# Patient Record
Sex: FEMALE | Race: WHITE | NOT HISPANIC OR LATINO | Employment: STUDENT | URBAN - METROPOLITAN AREA
[De-identification: names, ages, dates, MRNs, and addresses within clinical notes are randomized per-mention and may not be internally consistent; named-entity substitution may affect disease eponyms.]

---

## 2021-07-12 ENCOUNTER — OFFICE VISIT (OUTPATIENT)
Dept: OBGYN CLINIC | Facility: CLINIC | Age: 21
End: 2021-07-12
Payer: COMMERCIAL

## 2021-07-12 VITALS
HEIGHT: 64 IN | DIASTOLIC BLOOD PRESSURE: 66 MMHG | BODY MASS INDEX: 25.61 KG/M2 | HEART RATE: 60 BPM | WEIGHT: 150 LBS | SYSTOLIC BLOOD PRESSURE: 99 MMHG

## 2021-07-12 DIAGNOSIS — S46.911A REPETITIVE STRAIN INJURY OF ELBOW, RIGHT, INITIAL ENCOUNTER: Primary | ICD-10-CM
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
## 2021-07-12 DIAGNOSIS — G56.21 ULNAR NEUROPATHY AT ELBOW OF RIGHT UPPER EXTREMITY: ICD-10-CM

## 2021-07-12 PROCEDURE — 99204 OFFICE O/P NEW MOD 45 MIN: CPT | Performed by: ORTHOPAEDIC SURGERY

## 2021-07-12 NOTE — PROGRESS NOTES
Assessment/Plan:  1  Repetitive strain injury of elbow, right, initial encounter  Ambulatory referral to PT/OT hand therapy   2  Ulnar neuropathy at elbow of right upper extremity  EMG 1 Limb       Scribe Attestation    I,:  Rashmi Jaime MA am acting as a scribe while in the presence of the attending physician :       I,:  Peggy Cadet,  personally performed the services described in this documentation    as scribed in my presence  :          20-year-old female with right flexor pronator mass strain  Patient, her mother, and I engaged in discussion today in regards to her right elbow pain  I discussed with them that I do believe she strained her flexor pronator mass  There is a possibility that during the season she had tightness about the elbow in the flexor pronator mass that caused him to have numbness or tingling,  As this area could of been compressing the ulnar nerve  As this has been going on for a prolonged period of time I would recommend obtaining electrodiagnostic test to determine if  She has ulnar nerve compression at the cubital tunnel  In the interim I would like her to begin hand therapy for stretching, massage, and use of modalities to the right elbow  Referral was  placed into her chart today  She may continue activities as tolerated  I will see her back in 6 weeks time for re-evaluation  Subjective:   Renny Reis is a 24 y o  female who presents to the office today for an evaluation of her right upper extremity  Patient states she has had right arm pain from her elbow to her wrist for about a year and half  She notes no incident of injury of trauma at that time  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  She reports initially she had trouble releasing the ball and that her thumb, middle, and ring finger  She reports she would throw the ball directly into the ground    Initially she was told that this was a case of the "yips"  That she had a mental block that was causing this to happen  In the next season she was evaluated by the athletic training staff who did  Perform massage and stretching on her elbow and wrist   Patient reports she does continue to have pain at the medial elbow that extends into her wrist   She did have numbness and tingling while in season especially with throwing the ball  She reports the numbness is very intermittent at time since she has not been playing softball  Patient reports she has not been playing since April due to the end of the season  She has not tried any bracing, therapy, or medications  Review of Systems   Constitutional: Negative for chills, fever and unexpected weight change  HENT: Negative for hearing loss, nosebleeds and sore throat  Eyes: Negative for pain, redness and visual disturbance  Respiratory: Negative for cough, shortness of breath and wheezing  Cardiovascular: Negative for chest pain, palpitations and leg swelling  Gastrointestinal: Negative for abdominal pain, nausea and vomiting  Endocrine: Negative for polydipsia and polyuria  Genitourinary: Negative for dyspareunia and hematuria  Musculoskeletal: Positive for arthralgias and myalgias  Negative for joint swelling  Skin: Negative for rash and wound  Neurological: Negative for dizziness, numbness and headaches  Psychiatric/Behavioral: Negative for decreased concentration and suicidal ideas  The patient is not nervous/anxious  History reviewed  No pertinent past medical history  History reviewed  No pertinent surgical history  History reviewed  No pertinent family history      Social History     Occupational History    Not on file   Tobacco Use    Smoking status: Never Smoker    Smokeless tobacco: Never Used   Vaping Use    Vaping Use: Never used   Substance and Sexual Activity    Alcohol use: Not Currently    Drug use: Not Currently    Sexual activity: Not on file       No current outpatient medications on file     No Known Allergies    Objective:  Vitals:    07/12/21 1053   BP: 99/66   Pulse: 60       Ortho Exam   Right upper extremity  Skin intact   No erythema or ecchymosis  np swelling noted  FDP, FDS, and extensors intact   Full range of motion of wrist and elbow   Negative tinel's at the cubital tunnel   Negative tinel's over guyon's canal   Negative tinel's proximal median nerve   5/5 triceps  Non tender at the lateral epicondyle   Tender at flexor pronator mass   No pain with resisted wrist flexion and extension   Negative Spurling test    No Wartenberg or foments sign  Compartments are soft   Brisk capillary refill noted to all digits     Physical Exam  Vitals reviewed  Constitutional:       Appearance: She is well-developed  HENT:      Head: Normocephalic and atraumatic  Eyes:      General: No scleral icterus  Conjunctiva/sclera: Conjunctivae normal    Cardiovascular:      Rate and Rhythm: Normal rate  Pulses: Normal pulses  Pulmonary:      Effort: Pulmonary effort is normal  No respiratory distress  Musculoskeletal:      Cervical back: Normal range of motion and neck supple  Comments: As noted in HPI   Skin:     General: Skin is warm and dry  Neurological:      Mental Status: She is alert and oriented to person, place, and time  Psychiatric:         Behavior: Behavior normal          I have personally reviewed pertinent films in PACS and my interpretation is as follows:   No images today

## 2021-08-02 ENCOUNTER — EVALUATION (OUTPATIENT)
Dept: PHYSICAL THERAPY | Facility: CLINIC | Age: 21
End: 2021-08-02
Payer: COMMERCIAL

## 2021-08-02 DIAGNOSIS — M25.521 ELBOW PAIN, RIGHT: Primary | ICD-10-CM

## 2021-08-02 PROCEDURE — 97110 THERAPEUTIC EXERCISES: CPT | Performed by: PHYSICAL THERAPIST

## 2021-08-02 PROCEDURE — 97161 PT EVAL LOW COMPLEX 20 MIN: CPT | Performed by: PHYSICAL THERAPIST

## 2021-08-02 NOTE — PROGRESS NOTES
PT Evaluation     Today's date: 2021  Patient name: Jaswinder Ochoa  : 2000  MRN: [id number]  Referring provider: Herb Lucas DO  Dx:   Encounter Diagnosis     ICD-10-CM    1  Elbow pain, right  M25 521                   Assessment  Assessment details: Jaswinder Ochoa is a 22 y o  female who presents to physical therapy with diagnosis of right elbow pain   Jose Villalpando presents with pain, abnormal posture, and limitations in range of motion, strength, joint mobility, flexibility, and functional ability  The current limitations are affecting Ainsley's ability to function at prior level  She will benefit from skilled physical therapy to address the current impairments and functional limitations to enable her to return to daily activities at maximal level  Thank you for the referral     Impairments: abnormal or restricted ROM, activity intolerance, impaired physical strength, lacks appropriate home exercise program, weight-bearing intolerance and poor posture     Symptom irritability: lowUnderstanding of Dx/Px/POC: good   Prognosis: good    Goals  Patient will decrease pain at worst to 1/10  Patient will improve RUE strength 1/2 grade in all deficit planes  Patient will have no pain or discomfort with throwing  Patient will be independent with HEP  Plan  Patient would benefit from: skilled physical therapy  Planned modality interventions: cryotherapy and thermotherapy: hydrocollator packs  Planned therapy interventions: manual therapy, neuromuscular re-education, patient education, therapeutic activities, therapeutic exercise, therapeutic training and home exercise program  Frequency: 2x week  Duration in weeks: 6  Treatment plan discussed with: patient        Subjective Evaluation    History of Present Illness  Mechanism of injury: Jose Villalpando is a 22 y o  female presenting to physical therapy on 21 with primary complaints of right elbow pain  She mentions it started last year during the softball season   She had the "yips" where she could not throw the ball and then when she got over that she started having pain  She mentions it doesn't bother her unless she is in season/throwing  She has been playing softball since she was 6, 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  She mentions she has no difficulty completing ADLs or IADLs  She is in her off season right now until September she will play for about a month, then transition into workouts, then transition into full time in the spring  She mentions she does get occasional numbness in her pinkie and is going for nerve testing in October  Pain  Current pain ratin  At best pain ratin  At worst pain ratin  Location: R elbow / forearm  Quality: sharp  Relieving factors: rest    Hand dominance: right      Diagnostic Tests  No diagnostic tests performed  Treatments  No previous or current treatments  Patient Goals  Patient goals for therapy: decreased pain, increased strength and return to sport/leisure activities  Patient goal: "workout this issue as much as i can, so i can be better for the spring"        Objective    Posture: rounded shoulders, slouched  Palpation: tenderness noted along flexor and extensor mass on R UE  Elbow AROM:  Right: WFL  Left: WFL     R Wrist AROM:  Flexion 72 degrees  Extension: 27 degrees  Radial Deviation: 26 degrees  Ulnar Deviation: 36 degrees       UE Strength: (R,L)  Sh Flexion: 5/5 , 4+/5  Sh Abduction: 5/5 , 5/5  Sh Internal Rotation: 5/5 , 5/5  Sh External Rotation: 4+/5 , 4+/5  Elbow Flexion: 5/5 , 4+/5  Elbow Extension: 5/5 , 5/5  Wrist Flexion: 5/5 , 4/5  Wrist Extension: 5/5 , 4/5  Wrist Ulnar Deviation: 5/5 , 4+/5  Wrist Radial Deviation: 5/5 , 5/5  Supination: 5/5, 4+/5  Pronation 5/5, 5/5      Strength:  Left: 45, 55, 45  Right: 55, 45, 45       Special Tests:  Positive: Middle finger Test, hand shake test, cozens, caldwell           Precautions: none       Manuals / IASTM R Flexor Mass                                                    Neuro Re-Ed             Eccentric Wrist Extension 10x            TB Row             TB Ext             TB ER/IR at 90*                                                    Ther Ex             UBE             Wrist Flexion & Extension Stretcehs 15"x4 ea            Flexbar flex / Ext             Flexbar sup/pro             Digigrip Towel Squeeze for HEP 3"x20                                                   Ther Activity             Throwing when tolerated                          Gait Training                                       Modalities             MHP prn             CP prn

## 2021-08-10 ENCOUNTER — OFFICE VISIT (OUTPATIENT)
Dept: PHYSICAL THERAPY | Facility: CLINIC | Age: 21
End: 2021-08-10
Payer: COMMERCIAL

## 2021-08-10 DIAGNOSIS — M25.521 ELBOW PAIN, RIGHT: Primary | ICD-10-CM

## 2021-08-10 PROCEDURE — 97140 MANUAL THERAPY 1/> REGIONS: CPT

## 2021-08-10 PROCEDURE — 97110 THERAPEUTIC EXERCISES: CPT

## 2021-08-10 NOTE — PROGRESS NOTES
Daily Note     Today's date: 8/10/2021  Patient name: Angela Jane  : 2000  MRN: [id number]  Referring provider: Alicja Contreras DO  Dx:   Encounter Diagnosis     ICD-10-CM    1  Elbow pain, right  M25 521                   Subjective: Patient states she is doing alright today  Reports that she has a 2/10 pain arriving to clinic  Objective: See treatment diary below      Assessment: Tolerated treatment well  Initiated POC as able this visit  Provided patient with IASTM and TPR on anterior forearm, she began to feel faint and noted she started feeling spots  Patient's face turned pale  When asked if she had eaten anything prior to PT she denied  Assessed patients vitals which were all normal: 118/80 mmHg, 64bpm, 96% O2  Provided patient with candy and she slowly began to feel better  Concluded with ice and monitored patients symptoms before she left  She noted she was feeling fine prior to leaving  Encouraged patient to eat something before coming to PT to avoid feeling faint next visit, demonstrated understanding  Patient demonstrated fatigue post treatment, exhibited good technique with therapeutic exercises and would benefit from continued PT      Plan: Continue per plan of care        Precautions: none       Manuals 8/2 8/10           IASTM R Flexor Mass  RA IASTM and TPR to anterior forearm                                                  Neuro Re-Ed             Eccentric Wrist Extension 10x            TB Row  5"x 20GTB           TB Ext  5"x 20 GTB            TB ER/IR at 90*                                                    Ther Ex             UBE  5 min retro            Wrist Flexion & Extension Stretcehs 15"x4 ea 15"x4            Flexbar flex / Ext  10"x15           Flexbar sup/pro  10"x15            Digigrip Towel Squeeze for HEP 3"x20 Towel squeeze for HEP 3"x 20                                                   Ther Activity             Throwing when tolerated                          Gait Training                                       Modalities             MHP prn             CP prn

## 2021-08-13 ENCOUNTER — OFFICE VISIT (OUTPATIENT)
Dept: PHYSICAL THERAPY | Facility: CLINIC | Age: 21
End: 2021-08-13
Payer: COMMERCIAL

## 2021-08-13 DIAGNOSIS — M25.521 ELBOW PAIN, RIGHT: Primary | ICD-10-CM

## 2021-08-13 PROCEDURE — 97140 MANUAL THERAPY 1/> REGIONS: CPT

## 2021-08-13 PROCEDURE — 97110 THERAPEUTIC EXERCISES: CPT

## 2021-08-13 NOTE — PROGRESS NOTES
Daily Note     Today's date: 2021  Patient name: Matt Kim  : 2000  MRN: [id number]  Referring provider: Uilses Du DO  Dx:   Encounter Diagnosis     ICD-10-CM    1  Elbow pain, right  M25 521                   Subjective: Patient states she is doing well today  Denies pain arriving to therapy  Objective: See treatment diary below      Assessment: Tolerated treatment well  Patient did much better in PT as compared to last visit  She demonstrated less tenderness palpated to anteroir forearm  Progressed patient throughout session today  Added in throwing with no reports of pain  Will continue to progress as able  Patient demonstrated fatigue post treatment, exhibited good technique with therapeutic exercises and would benefit from continued PT      Plan: Continue per plan of care        Precautions: none       Manuals 8/2 8/10 8/13          IASTM R Flexor Mass  RA IASTM and TPR to anterior forearm RA IASTM and TPR to anterior forearm                                                 Neuro Re-Ed             Eccentric Wrist Extension 10x            TB Row  5"x 20GTB 5"x 20 GTB           TB Ext  5"x 20 GTB  5"x20 GTB          TB ER/IR at 90*   GTB 5 sec x 20                                                 Ther Ex             UBE  5 min retro  5 min retro           Wrist Flexion & Extension Stretcehs 15"x4 ea 15"x4  20 sec x 3          Flexbar flex / Ext  10"x15 10"x15          Flexbar sup/pro  10"x15  10" x 15          Digigrip Towel Squeeze for HEP 3"x20 Towel squeeze for HEP 3"x 20  Towel squeeze 3"x20          Wrist extension   2# 30          Wrist flexion   2# 30                        Ther Activity             Throwing when tolerated   Tramp throw  YMB 30                       Gait Training                                       Modalities             MHP prn             CP prn

## 2021-08-16 ENCOUNTER — APPOINTMENT (OUTPATIENT)
Dept: PHYSICAL THERAPY | Facility: CLINIC | Age: 21
End: 2021-08-16
Payer: COMMERCIAL

## 2021-08-20 ENCOUNTER — OFFICE VISIT (OUTPATIENT)
Dept: PHYSICAL THERAPY | Facility: CLINIC | Age: 21
End: 2021-08-20
Payer: COMMERCIAL

## 2021-08-20 DIAGNOSIS — M25.521 ELBOW PAIN, RIGHT: Primary | ICD-10-CM

## 2021-08-20 PROCEDURE — 97140 MANUAL THERAPY 1/> REGIONS: CPT

## 2021-08-20 PROCEDURE — 97110 THERAPEUTIC EXERCISES: CPT

## 2021-08-20 NOTE — PROGRESS NOTES
Daily Note     Today's date: 2021  Patient name: Marietta Read  : 2000  MRN: [id number]  Referring provider: Shilo Mead DO  Dx:   Encounter Diagnosis     ICD-10-CM    1  Elbow pain, right  M25 521                   Subjective: Patient states she had mild soreness last visit  Reports that her pain has been pretty good overall  Objective: See treatment diary below      Assessment: Tolerated treatment well  Patient has been demonstrating improvements with pain and overall function of elbow  Progressed patient through Te today and added additional exercises  Less tenderness palpated with manuals  Encouraged patient to resume with PT until she begins softball to make sure sxs stay subsided  Patient demonstrated fatigue post treatment, exhibited good technique with therapeutic exercises and would benefit from continued PT      Plan: Continue per plan of care        Precautions: none       Manuals 8/2 8/10 8/13 8/20         IASTM R Flexor Mass  RA IASTM and TPR to anterior forearm RA IASTM and TPR to anterior forearm RA IASTM and TPR to anterior forearm                                                Neuro Re-Ed             Eccentric Wrist Extension 10x            TB Row  5"x 20GTB 5"x 20 GTB  5" x 30 BTB          TB Ext  5"x 20 GTB  5"x20 GTB 5" x 30 BTB         TB ER/IR at 90*   GTB 5 sec x 20 BTB 5 sec x 30                                                 Ther Ex             UBE  5 min retro  5 min retro  5 min          Wrist Flexion & Extension Stretcehs 15"x4 ea 15"x4  20 sec x 3 10 sec x 15 ea         Flexbar flex / Ext  10"x15 10"x15  10"x 15         Flexbar sup/pro  10"x15  10" x 15 10" x 15         Digigrip Towel Squeeze for HEP 3"x20 Towel squeeze for HEP 3"x 20  Towel squeeze 3"x20 Towel squeeze 3x 20"         Wrist extension   2# 30 2# 30         Wrist flexion   2# 30  2# 30                      Ther Activity             Throwing when tolerated   Tramp throw  YMB 30 Tramp throw RMb 30 Wall circles /c RMB    RMB 20 cw, ccw          Gait Training                                       Modalities             MHP prn             CP prn

## 2021-08-23 ENCOUNTER — OFFICE VISIT (OUTPATIENT)
Dept: OBGYN CLINIC | Facility: CLINIC | Age: 21
End: 2021-08-23
Payer: COMMERCIAL

## 2021-08-23 ENCOUNTER — APPOINTMENT (OUTPATIENT)
Dept: PHYSICAL THERAPY | Facility: CLINIC | Age: 21
End: 2021-08-23
Payer: COMMERCIAL

## 2021-08-23 VITALS
WEIGHT: 150 LBS | DIASTOLIC BLOOD PRESSURE: 68 MMHG | SYSTOLIC BLOOD PRESSURE: 106 MMHG | HEIGHT: 64 IN | BODY MASS INDEX: 25.61 KG/M2 | HEART RATE: 80 BPM

## 2021-08-23 DIAGNOSIS — G56.21 ULNAR NEUROPATHY AT ELBOW OF RIGHT UPPER EXTREMITY: ICD-10-CM

## 2021-08-23 DIAGNOSIS — S46.911D REPETITIVE STRAIN INJURY OF RIGHT ELBOW, SUBSEQUENT ENCOUNTER: Primary | ICD-10-CM
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
## 2021-08-23 PROCEDURE — 99213 OFFICE O/P EST LOW 20 MIN: CPT | Performed by: ORTHOPAEDIC SURGERY

## 2021-08-23 RX ORDER — NORGESTIMATE AND ETHINYL ESTRADIOL 7DAYSX3 28
KIT ORAL
COMMUNITY
Start: 2021-08-12

## 2021-08-23 NOTE — PROGRESS NOTES
Assessment/Plan:  1  Repetitive strain injury of right elbow, subsequent encounter     2  Ulnar neuropathy at elbow of right upper extremity         Scribe Attestation    I,:  Malika Jerez MA am acting as a scribe while in the presence of the attending physician :       I,:  Summer Traore DO personally performed the services described in this documentation    as scribed in my presence :             [facility] is doing well  She is non tender on exam  She has full range of motion  Patient was advised to continue with physician directed HEP  She has no restrictions at this time  She was advised to ice the elbow after softball practice  She may take Advil OTC as needed for pain  If she is still experiencing the shooting pain and numbness by October when her EMG is scheduled she was instructed to undergo this study  If she is doing well she may cancel the EMG  She may follow up with me as needed  Subjective:   Mela Melendez is a 22 y o  female who presents to the office today for follow up evaluation right flexor pronator mass strain and ulnar neuritis  Patient states she is doing well  She has been attending formal therapy  She states this has been providing her with some relief  Patient states she recently moved into a new apartment and has been doing a lot of lifting  She states this did increase her pain  She notes pain to the posterior aspect of her elbow  She states this will radiate down into her small finger  She denies any numbness or tingling  She does have the EMG scheduled for October  Review of Systems   Constitutional: Negative for chills and fever  HENT: Negative for drooling and sneezing  Eyes: Negative for redness  Respiratory: Negative for cough and wheezing  Gastrointestinal: Negative for nausea and vomiting  Musculoskeletal: Negative for arthralgias, joint swelling and myalgias  Neurological: Negative for weakness and numbness     Psychiatric/Behavioral: Negative for behavioral problems  The patient is not nervous/anxious  History reviewed  No pertinent past medical history  History reviewed  No pertinent surgical history  History reviewed  No pertinent family history  Social History     Occupational History    Not on file   Tobacco Use    Smoking status: Never Smoker    Smokeless tobacco: Never Used   Vaping Use    Vaping Use: Never used   Substance and Sexual Activity    Alcohol use: Not Currently    Drug use: Not Currently    Sexual activity: Not on file         Current Outpatient Medications:     Tri-Estarylla 0 18/0 215/0 25 MG-35 MCG per tablet, , Disp: , Rfl:     No Known Allergies    Objective:  Vitals:    08/23/21 1717   BP: 106/68   Pulse: 80       Ortho Exam     Right elbow    NTTP lateral epicondyle  NTTP flexor pronator  NTTP medial epicondyle   Full ROM  - tinel's cubital tunnel  Compartments soft  Brisk capillary refill  S/m intact median, radial, and ulnar nerve     Physical Exam  Constitutional:       Appearance: She is well-developed  HENT:      Head: Normocephalic and atraumatic  Eyes:      General:         Right eye: No discharge  Left eye: No discharge  Conjunctiva/sclera: Conjunctivae normal    Cardiovascular:      Rate and Rhythm: Normal rate  Pulmonary:      Effort: Pulmonary effort is normal  No respiratory distress  Musculoskeletal:      Cervical back: Normal range of motion and neck supple  Comments: As noted in HPI   Skin:     General: Skin is warm and dry  Neurological:      Mental Status: She is alert and oriented to person, place, and time  Psychiatric:         Behavior: Behavior normal          Thought Content:  Thought content normal          Judgment: Judgment normal

## 2021-08-25 ENCOUNTER — OFFICE VISIT (OUTPATIENT)
Dept: PHYSICAL THERAPY | Facility: CLINIC | Age: 21
End: 2021-08-25
Payer: COMMERCIAL

## 2021-08-25 DIAGNOSIS — M25.521 ELBOW PAIN, RIGHT: Primary | ICD-10-CM

## 2021-08-25 PROCEDURE — 97140 MANUAL THERAPY 1/> REGIONS: CPT

## 2021-08-25 PROCEDURE — 97110 THERAPEUTIC EXERCISES: CPT

## 2021-08-25 NOTE — PROGRESS NOTES
Daily Note     Today's date: 2021  Patient name: Courtney Masters  : 2000  MRN: [id number]  Referring provider: Julián Velazco DO  Dx:   Encounter Diagnosis     ICD-10-CM    1  Elbow pain, right  M25 521                   Subjective: Patient states that she was moving over the weekend and had increase in symptoms after lifting boxes repetivily  Objective: See treatment diary below      Assessment: Tolerated treatment well  Initiated POC on UBE for warm up  Added in wall clocks today as well as progressed reps of other TE  Applied TPR to olecranon and no complaints of pain  Patient demonstrated fatigue post treatment, exhibited good technique with therapeutic exercises and would benefit from continued PT      Plan: Continue per plan of care        Precautions: none       Manuals 8/2 8/10 8/13 8/20 8/25        IASTM R Flexor Mass  RA IASTM and TPR to anterior forearm RA IASTM and TPR to anterior forearm RA IASTM and TPR to anterior forearm RA IASTM and TPR to anterior forearm                                               Neuro Re-Ed             Eccentric Wrist Extension 10x            TB Row  5"x 20GTB 5"x 20 GTB  5" x 30 BTB  5 sec x 30 BTB        TB Ext  5"x 20 GTB  5"x20 GTB 5" x 30 BTB 5 sec x 30 BTB        TB ER/IR at 90*   GTB 5 sec x 20 BTB 5 sec x 30  BTB 30 ea                                               Ther Ex             UBE  5 min retro  5 min retro  5 min  5 min         Wrist Flexion & Extension Stretcehs 15"x4 ea 15"x4  20 sec x 3 10 sec x 15 ea 10 sec x 15 ea        Flexbar flex / Ext  10"x15 10"x15  10"x 15 10"x 15        Flexbar sup/pro  10"x15  10" x 15 10" x 15 10" x 15        Digigrip Towel Squeeze for HEP 3"x20 Towel squeeze for HEP 3"x 20  Towel squeeze 3"x20 Towel squeeze 3x 20" Towel squeeze 3x 20"        Wrist extension   2# 30 2# 30 3# 30        Wrist flexion   2# 30  2# 30 3# 20        Wall clocks      GTB 10        Ther Activity             Throwing when tolerated Tramp throw  YMB 30 Tramp throw RMb 30 Tramp throw RMB 30        Wall circles /c RMB    RMB 20 cw, ccw  RMB 30 cw, ccw         Gait Training                                       Modalities             MHP prn             CP prn

## 2021-08-27 ENCOUNTER — APPOINTMENT (OUTPATIENT)
Dept: PHYSICAL THERAPY | Facility: CLINIC | Age: 21
End: 2021-08-27
Payer: COMMERCIAL

## 2021-08-30 ENCOUNTER — APPOINTMENT (OUTPATIENT)
Dept: PHYSICAL THERAPY | Facility: CLINIC | Age: 21
End: 2021-08-30
Payer: COMMERCIAL

## 2021-09-01 ENCOUNTER — APPOINTMENT (OUTPATIENT)
Dept: PHYSICAL THERAPY | Facility: CLINIC | Age: 21
End: 2021-09-01
Payer: COMMERCIAL

## 2021-09-03 ENCOUNTER — APPOINTMENT (OUTPATIENT)
Dept: PHYSICAL THERAPY | Facility: CLINIC | Age: 21
End: 2021-09-03
Payer: COMMERCIAL

## 2021-09-08 ENCOUNTER — OFFICE VISIT (OUTPATIENT)
Dept: PHYSICAL THERAPY | Facility: CLINIC | Age: 21
End: 2021-09-08
Payer: COMMERCIAL

## 2021-09-08 DIAGNOSIS — M25.521 ELBOW PAIN, RIGHT: Primary | ICD-10-CM

## 2021-09-08 PROCEDURE — 97140 MANUAL THERAPY 1/> REGIONS: CPT

## 2021-09-08 PROCEDURE — 97110 THERAPEUTIC EXERCISES: CPT

## 2021-09-08 NOTE — PROGRESS NOTES
Daily Note     Today's date: 2021  Patient name: Home Montoya  : 2000  MRN: [id number]  Referring provider: Heather Thompson DO  Dx:   Encounter Diagnosis     ICD-10-CM    1  Elbow pain, right  M25 521                   Subjective: Patient states she is doing alright  Reports she had one softball practice and had some mild discomfort into elbow  Objective: See treatment diary below      Assessment: Tolerated treatment well  Continued to progress patient through exercises  She had demonstrated less tenderness palpated to elbow  Continue to progress as able  Patient demonstrated fatigue post treatment, exhibited good technique with therapeutic exercises and would benefit from continued PT      Plan: Continue per plan of care        Precautions: none       Manuals 8/2 8/10 8/13 8/20 8/25 9/8       IASTM R Flexor Mass  RA IASTM and TPR to anterior forearm RA IASTM and TPR to anterior forearm RA IASTM and TPR to anterior forearm RA IASTM and TPR to anterior forearm RA                                              Neuro Re-Ed             Eccentric Wrist Extension 10x            TB Row  5"x 20GTB 5"x 20 GTB  5" x 30 BTB  5 sec x 30 BTB BTB 30        TB Ext  5"x 20 GTB  5"x20 GTB 5" x 30 BTB 5 sec x 30 BTB BTB 30       TB ER/IR at 90*   GTB 5 sec x 20 BTB 5 sec x 30  BTB 30 ea BTB 30                                              Ther Ex             UBE  5 min retro  5 min retro  5 min  5 min  5 min        Wrist Flexion & Extension Stretcehs 15"x4 ea 15"x4  20 sec x 3 10 sec x 15 ea 10 sec x 15 ea Dc to HEP       Flexbar flex / Ext  10"x15 10"x15  10"x 15 10"x 15 10" x 15       Flexbar sup/pro  10"x15  10" x 15 10" x 15 10" x 15 10 " x 15        Digigrip Towel Squeeze for HEP 3"x20 Towel squeeze for HEP 3"x 20  Towel squeeze 3"x20 Towel squeeze 3x 20" Towel squeeze 3x 20" gripper 25#  10"x15       Wrist extension   2# 30 2# 30 3# 30 3# 30       Wrist flexion   2# 30  2# 30 3# 20 3# 30       Wall clocks GTB 10 GTB 10       Ther Activity             Throwing when tolerated   Tramp throw  YMB 30 Tramp throw RMb 30 Tramp throw RMB 30 tramp throw RMB 3       Wall circles /c RMB    RMB 20 cw, ccw  RMB 30 cw, ccw  RMB 30       Gait Training                                       Modalities             MHP prn             CP prn

## 2021-09-13 ENCOUNTER — APPOINTMENT (OUTPATIENT)
Dept: PHYSICAL THERAPY | Facility: CLINIC | Age: 21
End: 2021-09-13
Payer: COMMERCIAL

## 2021-09-15 ENCOUNTER — OFFICE VISIT (OUTPATIENT)
Dept: PHYSICAL THERAPY | Facility: CLINIC | Age: 21
End: 2021-09-15
Payer: COMMERCIAL

## 2021-09-15 DIAGNOSIS — M25.521 ELBOW PAIN, RIGHT: Primary | ICD-10-CM

## 2021-09-15 PROCEDURE — 97140 MANUAL THERAPY 1/> REGIONS: CPT

## 2021-09-15 PROCEDURE — 97110 THERAPEUTIC EXERCISES: CPT

## 2021-09-15 NOTE — PROGRESS NOTES
Daily Note     Today's date: 9/15/2021  Patient name: Nata Alberts  : 2000  MRN: [id number]  Referring provider: Jose Coronel DO  Dx:   Encounter Diagnosis     ICD-10-CM    1  Elbow pain, right  M25 521                   Subjective: Patient states she is having soreness on posterior forearm  Objective: See treatment diary below      Assessment: Tolerated treatment well  Initiated POC on UBE for warm up  She continues to do well with no complaints of pain throughout session  Added in forearm stretch and encouraged her to be doing this stretch at home  She did present with tightness/tenderness into right side of posterior forarm  Patient demonstrated fatigue post treatment, exhibited good technique with therapeutic exercises and would benefit from continued PT      Plan: Continue per plan of care        Precautions: none       Manuals 8/2 8/10 8/13 8/20 8/25 9/8 9/15      IASTM R Flexor Mass  RA IASTM and TPR to anterior forearm RA IASTM and TPR to anterior forearm RA IASTM and TPR to anterior forearm RA IASTM and TPR to anterior forearm RA RA posterior forearm                                               Neuro Re-Ed             Eccentric Wrist Extension 10x            TB Row  5"x 20GTB 5"x 20 GTB  5" x 30 BTB  5 sec x 30 BTB BTB 30  MTB 30      TB Ext  5"x 20 GTB  5"x20 GTB 5" x 30 BTB 5 sec x 30 BTB BTB 30 MTB 30      TB ER/IR at 90*   GTB 5 sec x 20 BTB 5 sec x 30  BTB 30 ea BTB 30 MTB 30      Forearm stretch       20 sec x 3                                 Ther Ex             UBE  5 min retro  5 min retro  5 min  5 min  5 min  5 min       Wrist Flexion & Extension Stretcehs 15"x4 ea 15"x4  20 sec x 3 10 sec x 15 ea 10 sec x 15 ea Dc to HEP       Flexbar flex / Ext  10"x15 10"x15  10"x 15 10"x 15 10" x 15 10"x15      Flexbar sup/pro  10"x15  10" x 15 10" x 15 10" x 15 10 " x 15  10"x15      Digigrip Towel Squeeze for HEP 3"x20 Towel squeeze for HEP 3"x 20  Towel squeeze 3"x20 Towel squeeze 3x 20" Towel squeeze 3x 20" gripper 25#  10"x15 griper 25# 15 sec x 15       Wrist extension   2# 30 2# 30 3# 30 3# 30 3# 30      Wrist flexion   2# 30  2# 30 3# 20 3# 30 3# 30       Wall clocks      GTB 10 GTB 10 BTB 10      Ther Activity             Throwing when tolerated   Tramp throw  YMB 30 Tramp throw RMb 30 Tramp throw RMB 30 tramp throw RMB 3 tramp throw RMB 30      Wall circles /c RMB    RMB 20 cw, ccw  RMB 30 cw, ccw  RMB 30 RMB 30      Gait Training                                       Modalities             MHP prn             CP prn

## 2021-09-20 ENCOUNTER — APPOINTMENT (OUTPATIENT)
Dept: PHYSICAL THERAPY | Facility: CLINIC | Age: 21
End: 2021-09-20
Payer: COMMERCIAL

## 2021-09-22 ENCOUNTER — APPOINTMENT (OUTPATIENT)
Dept: PHYSICAL THERAPY | Facility: CLINIC | Age: 21
End: 2021-09-22
Payer: COMMERCIAL

## 2021-09-27 ENCOUNTER — APPOINTMENT (OUTPATIENT)
Dept: PHYSICAL THERAPY | Facility: CLINIC | Age: 21
End: 2021-09-27
Payer: COMMERCIAL

## 2021-09-29 ENCOUNTER — OFFICE VISIT (OUTPATIENT)
Dept: PHYSICAL THERAPY | Facility: CLINIC | Age: 21
End: 2021-09-29
Payer: COMMERCIAL

## 2021-09-29 DIAGNOSIS — M25.521 ELBOW PAIN, RIGHT: Primary | ICD-10-CM

## 2021-09-29 PROCEDURE — 97140 MANUAL THERAPY 1/> REGIONS: CPT

## 2021-09-29 PROCEDURE — 97110 THERAPEUTIC EXERCISES: CPT

## 2021-09-29 NOTE — PROGRESS NOTES
Daily Note / Discharge     Today's date: 2021  Patient name: Angela Jane  : 2000  MRN: [id number]  Referring provider: Alicja Contreras DO  Dx:   Encounter Diagnosis     ICD-10-CM    1  Elbow pain, right  M25 521                   Subjective: Patient states she is ready for discharge at this time  Objective: See treatment diary below      Assessment: Tolerated treatment well  Patient has met all goals at this time  Encouraged patient to seek PT in future if she has a flare up         Plan: Discharge      Precautions: none       Manuals 8/2 8/10 8/13 8/20 8/25 9/8 9/15 9/29     IASTM R Flexor Mass  RA IASTM and TPR to anterior forearm RA IASTM and TPR to anterior forearm RA IASTM and TPR to anterior forearm RA IASTM and TPR to anterior forearm RA RA posterior forearm   RA forearm                                             Neuro Re-Ed             Eccentric Wrist Extension 10x            TB Row  5"x 20GTB 5"x 20 GTB  5" x 30 BTB  5 sec x 30 BTB BTB 30  MTB 30 MTB 30     TB Ext  5"x 20 GTB  5"x20 GTB 5" x 30 BTB 5 sec x 30 BTB BTB 30 MTB 30 MTB 30     TB ER/IR at 90*   GTB 5 sec x 20 BTB 5 sec x 30  BTB 30 ea BTB 30 MTB 30 MTB 30     Forearm stretch       20 sec x 3                                 Ther Ex             UBE  5 min retro  5 min retro  5 min  5 min  5 min  5 min  5 min      Wrist Flexion & Extension Stretcehs 15"x4 ea 15"x4  20 sec x 3 10 sec x 15 ea 10 sec x 15 ea Dc to HEP       Flexbar flex / Ext  10"x15 10"x15  10"x 15 10"x 15 10" x 15 10"x15 10"x 15     Flexbar sup/pro  10"x15  10" x 15 10" x 15 10" x 15 10 " x 15  10"x15 10"x 15     Digigrip Towel Squeeze for HEP 3"x20 Towel squeeze for HEP 3"x 20  Towel squeeze 3"x20 Towel squeeze 3x 20" Towel squeeze 3x 20" gripper 25#  10"x15 griper 25# 15 sec x 15  griper 25# 15 sec x 15      Wrist extension   2# 30 2# 30 3# 30 3# 30 3# 30 3# 30     Wrist flexion   2# 30  2# 30 3# 20 3# 30 3# 30  3# 30     Wall clocks      GTB 10 GTB 10 BTB 10 BTB 10      Ther Activity             Throwing when tolerated   Tramp throw  YMB 30 Tramp throw RMb 30 Tramp throw RMB 30 tramp throw RMB 3 tramp throw RMB 30 tramp throw RMB 30        Wall circles /c RMB    RMB 20 cw, ccw  RMB 30 cw, ccw  RMB 30 RMB 30 RMB 30      Gait Training                                       Modalities             MHP prn             CP prn

## 2025-06-26 ENCOUNTER — ANESTHESIA (OUTPATIENT)
Dept: ANESTHESIOLOGY | Facility: HOSPITAL | Age: 25
End: 2025-06-26

## 2025-06-26 ENCOUNTER — ANESTHESIA EVENT (OUTPATIENT)
Dept: ANESTHESIOLOGY | Facility: HOSPITAL | Age: 25
End: 2025-06-26

## 2025-07-01 NOTE — PRE-PROCEDURE INSTRUCTIONS
No outpatient medications have been marked as taking for the 7/11/25 encounter (Hospital Encounter).      Medication instructions for day of surgery reviewed. Please take all instructed medications with only a sip of water. Please do not take any over the counter (non-prescribed) vitamins or supplements for one week prior to date of surgery.      You will receive a call one business day prior to surgery with an arrival time and hospital directions. If your surgery is scheduled on a Monday, the hospital will be calling you on the Friday prior to your surgery. If you have not heard from anyone by 8pm, please call the hospital supervisor through the hospital  at [phone]. ([place] [phone] or [place] [phone]).    Do not eat or drink anything after midnight the night before your surgery, including candy, mints, lifesavers, or chewing gum. Do not drink alcohol 24hrs before your surgery. Try not to smoke at least 24hrs before your surgery.       Follow the pre surgery showering instructions as listed in the “My Surgical Experience Booklet” or otherwise provided by your surgeon's office. Do not use a blade to shave the surgical area 1 week before surgery. It is okay to use a clean electric clippers up to 24 hours before surgery. Do not apply any lotions, creams, including makeup, cologne, deodorant, or perfumes after showering on the day of your surgery. Do not use dry shampoo, hair spray, hair gel, or any type of hair products.     No contact lenses, eye make-up, or artificial eyelashes. Remove nail polish, including gel polish, and any artificial, gel, or acrylic nails if possible. Remove all jewelry including rings and body piercing jewelry.     Wear causal clothing that is easy to take on and off. Consider your type of surgery.    Keep any valuables, jewelry, piercings at home. Please bring any specially ordered equipment (sling, braces) if indicated.    Arrange for a responsible person to drive  you to and from the hospital on the day of your surgery. Please confirm the visitor policy for the day of your procedure when you receive your phone call with an arrival time.     Call the surgeon's office with any new illnesses, exposures, or additional questions prior to surgery.    Please reference your “My Surgical Experience Booklet” for additional information to prepare for your upcoming surgery.

## 2025-07-10 ENCOUNTER — ANESTHESIA EVENT (OUTPATIENT)
Dept: PERIOP | Facility: AMBULARY SURGERY CENTER | Age: 25
End: 2025-07-10
Payer: COMMERCIAL

## 2025-07-10 NOTE — ANESTHESIA PREPROCEDURE EVALUATION
Procedure:  REPAIR OF NASAL VESTIBULAR STENOSIS (Nose)  SEPTOPLASTY (Nose)  TURBINECTOMY (Nose)  RHINOPLASTY (Nose)    Relevant Problems   No relevant active problems        Physical Exam    Airway     Mallampati score: II  TM Distance: >3 FB  Neck ROM: full  Mouth opening: >= 4 cm      Cardiovascular  Rhythm: regular, Rate: normal    Dental   No notable dental hx     Pulmonary   Breath sounds clear to auscultation    Neurological    She appears awake, alert and oriented x3.      Other Findings  post-pubertal.      Anesthesia Plan  ASA Score- 1     Anesthesia Type- general with ASA Monitors.         Additional Monitors:     Airway Plan: Oral ETT.           Plan Factors-    Chart reviewed.        Patient is not a current smoker.              Induction- intravenous.    Postoperative Plan- Plan for postoperative opioid use.   Monitoring Plan - Monitoring plan - standard ASA monitoring  Post Operative Pain Plan - plan for postoperative opioid use    Perioperative Resuscitation Plan - Level 1 - Full Code.       Informed Consent- Anesthetic plan and risks discussed with patient.  I personally reviewed this patient with the CRNA. Discussed and agreed on the Anesthesia Plan with the CRNA..      NPO Status:  No vitals data found for the desired time range.

## 2025-07-11 ENCOUNTER — ANESTHESIA (OUTPATIENT)
Dept: PERIOP | Facility: AMBULARY SURGERY CENTER | Age: 25
End: 2025-07-11
Payer: COMMERCIAL

## 2025-07-11 ENCOUNTER — HOSPITAL ENCOUNTER (OUTPATIENT)
Facility: AMBULARY SURGERY CENTER | Age: 25
Setting detail: OUTPATIENT SURGERY
Discharge: HOME | End: 2025-07-11
Attending: OTOLARYNGOLOGY | Admitting: OTOLARYNGOLOGY
Payer: COMMERCIAL

## 2025-07-11 VITALS
DIASTOLIC BLOOD PRESSURE: 62 MMHG | HEIGHT: 64 IN | BODY MASS INDEX: 26.46 KG/M2 | OXYGEN SATURATION: 100 % | TEMPERATURE: 98 F | SYSTOLIC BLOOD PRESSURE: 111 MMHG | HEART RATE: 65 BPM | WEIGHT: 155 LBS | RESPIRATION RATE: 18 BRPM

## 2025-07-11 DIAGNOSIS — J34.2 NASAL SEPTAL DEVIATION: ICD-10-CM

## 2025-07-11 DIAGNOSIS — J34.3 NASAL TURBINATE HYPERTROPHY: ICD-10-CM

## 2025-07-11 DIAGNOSIS — J34.89 NASAL OBSTRUCTION: Primary | ICD-10-CM

## 2025-07-11 LAB
EXT PREGNANCY TEST URINE: NEGATIVE
EXT. CONTROL: NORMAL

## 2025-07-11 PROCEDURE — 81025 URINE PREGNANCY TEST: CPT | Performed by: OTOLARYNGOLOGY

## 2025-07-11 PROCEDURE — 30465 REPAIR NASAL STENOSIS: CPT | Performed by: OTOLARYNGOLOGY

## 2025-07-11 PROCEDURE — 30520 REPAIR OF NASAL SEPTUM: CPT | Performed by: OTOLARYNGOLOGY

## 2025-07-11 PROCEDURE — 30140 RESECT INFERIOR TURBINATE: CPT | Performed by: OTOLARYNGOLOGY

## 2025-07-11 RX ORDER — ACETAMINOPHEN 325 MG/1
650 TABLET ORAL EVERY 6 HOURS PRN
Status: DISCONTINUED | OUTPATIENT
Start: 2025-07-11 | End: 2025-07-11

## 2025-07-11 RX ORDER — MIDAZOLAM HYDROCHLORIDE 2 MG/2ML
INJECTION, SOLUTION INTRAMUSCULAR; INTRAVENOUS AS NEEDED
Status: DISCONTINUED | OUTPATIENT
Start: 2025-07-11 | End: 2025-07-11

## 2025-07-11 RX ORDER — DEXAMETHASONE SODIUM PHOSPHATE 10 MG/ML
INJECTION, SOLUTION INTRAMUSCULAR; INTRAVENOUS AS NEEDED
Status: DISCONTINUED | OUTPATIENT
Start: 2025-07-11 | End: 2025-07-11

## 2025-07-11 RX ORDER — CEFAZOLIN SODIUM 2 G/50ML
2000 SOLUTION INTRAVENOUS ONCE
Status: COMPLETED | OUTPATIENT
Start: 2025-07-11 | End: 2025-07-11

## 2025-07-11 RX ORDER — MAGNESIUM HYDROXIDE 1200 MG/15ML
LIQUID ORAL AS NEEDED
Status: DISCONTINUED | OUTPATIENT
Start: 2025-07-11 | End: 2025-07-11 | Stop reason: HOSPADM

## 2025-07-11 RX ORDER — HYDROMORPHONE HCL/PF 1 MG/ML
0.5 SYRINGE (ML) INJECTION
Status: DISCONTINUED | OUTPATIENT
Start: 2025-07-11 | End: 2025-07-11 | Stop reason: HOSPADM

## 2025-07-11 RX ORDER — ROCURONIUM BROMIDE 10 MG/ML
INJECTION, SOLUTION INTRAVENOUS AS NEEDED
Status: DISCONTINUED | OUTPATIENT
Start: 2025-07-11 | End: 2025-07-11

## 2025-07-11 RX ORDER — GINSENG 100 MG
CAPSULE ORAL AS NEEDED
Status: DISCONTINUED | OUTPATIENT
Start: 2025-07-11 | End: 2025-07-11 | Stop reason: HOSPADM

## 2025-07-11 RX ORDER — OXYCODONE HYDROCHLORIDE 5 MG/1
5 TABLET ORAL EVERY 4 HOURS PRN
Qty: 10 TABLET | Refills: 0 | Status: SHIPPED | OUTPATIENT
Start: 2025-07-11 | End: 2025-07-21

## 2025-07-11 RX ORDER — OXYCODONE HCL 5 MG/5 ML
5 SOLUTION, ORAL ORAL EVERY 4 HOURS PRN
Refills: 0 | Status: DISCONTINUED | OUTPATIENT
Start: 2025-07-11 | End: 2025-07-11 | Stop reason: HOSPADM

## 2025-07-11 RX ORDER — ACETAMINOPHEN 325 MG/1
650 TABLET ORAL EVERY 6 HOURS PRN
Status: DISCONTINUED | OUTPATIENT
Start: 2025-07-11 | End: 2025-07-11 | Stop reason: HOSPADM

## 2025-07-11 RX ORDER — LIDOCAINE HYDROCHLORIDE AND EPINEPHRINE 10; 10 MG/ML; UG/ML
INJECTION, SOLUTION INFILTRATION; PERINEURAL AS NEEDED
Status: DISCONTINUED | OUTPATIENT
Start: 2025-07-11 | End: 2025-07-11 | Stop reason: HOSPADM

## 2025-07-11 RX ORDER — LIDOCAINE HYDROCHLORIDE 10 MG/ML
INJECTION, SOLUTION EPIDURAL; INFILTRATION; INTRACAUDAL; PERINEURAL AS NEEDED
Status: DISCONTINUED | OUTPATIENT
Start: 2025-07-11 | End: 2025-07-11

## 2025-07-11 RX ORDER — SODIUM CHLORIDE, SODIUM LACTATE, POTASSIUM CHLORIDE, CALCIUM CHLORIDE 600; 310; 30; 20 MG/100ML; MG/100ML; MG/100ML; MG/100ML
125 INJECTION, SOLUTION INTRAVENOUS CONTINUOUS
Status: CANCELLED | OUTPATIENT
Start: 2025-07-11

## 2025-07-11 RX ORDER — ONDANSETRON 2 MG/ML
4 INJECTION INTRAMUSCULAR; INTRAVENOUS ONCE AS NEEDED
Status: DISCONTINUED | OUTPATIENT
Start: 2025-07-11 | End: 2025-07-11 | Stop reason: HOSPADM

## 2025-07-11 RX ORDER — NEOSTIGMINE METHYLSULFATE 1 MG/ML
INJECTION INTRAVENOUS AS NEEDED
Status: DISCONTINUED | OUTPATIENT
Start: 2025-07-11 | End: 2025-07-11

## 2025-07-11 RX ORDER — PROPOFOL 10 MG/ML
INJECTION, EMULSION INTRAVENOUS CONTINUOUS PRN
Status: DISCONTINUED | OUTPATIENT
Start: 2025-07-11 | End: 2025-07-11

## 2025-07-11 RX ORDER — FENTANYL CITRATE 50 UG/ML
INJECTION, SOLUTION INTRAMUSCULAR; INTRAVENOUS AS NEEDED
Status: DISCONTINUED | OUTPATIENT
Start: 2025-07-11 | End: 2025-07-11

## 2025-07-11 RX ORDER — DIPHENHYDRAMINE HYDROCHLORIDE 50 MG/ML
12.5 INJECTION, SOLUTION INTRAMUSCULAR; INTRAVENOUS ONCE AS NEEDED
Status: DISCONTINUED | OUTPATIENT
Start: 2025-07-11 | End: 2025-07-11 | Stop reason: HOSPADM

## 2025-07-11 RX ORDER — ALBUTEROL SULFATE 0.83 MG/ML
2.5 SOLUTION RESPIRATORY (INHALATION) ONCE AS NEEDED
Status: DISCONTINUED | OUTPATIENT
Start: 2025-07-11 | End: 2025-07-11 | Stop reason: HOSPADM

## 2025-07-11 RX ORDER — SODIUM CHLORIDE, SODIUM LACTATE, POTASSIUM CHLORIDE, CALCIUM CHLORIDE 600; 310; 30; 20 MG/100ML; MG/100ML; MG/100ML; MG/100ML
INJECTION, SOLUTION INTRAVENOUS CONTINUOUS PRN
Status: DISCONTINUED | OUTPATIENT
Start: 2025-07-11 | End: 2025-07-11

## 2025-07-11 RX ORDER — OXYCODONE HYDROCHLORIDE 5 MG/1
5 TABLET ORAL EVERY 6 HOURS PRN
Refills: 0 | Status: DISCONTINUED | OUTPATIENT
Start: 2025-07-11 | End: 2025-07-11

## 2025-07-11 RX ORDER — ONDANSETRON 2 MG/ML
INJECTION INTRAMUSCULAR; INTRAVENOUS AS NEEDED
Status: DISCONTINUED | OUTPATIENT
Start: 2025-07-11 | End: 2025-07-11

## 2025-07-11 RX ORDER — HYDROMORPHONE HCL/PF 1 MG/ML
SYRINGE (ML) INJECTION AS NEEDED
Status: DISCONTINUED | OUTPATIENT
Start: 2025-07-11 | End: 2025-07-11

## 2025-07-11 RX ORDER — GLYCOPYRROLATE 0.2 MG/ML
INJECTION INTRAMUSCULAR; INTRAVENOUS AS NEEDED
Status: DISCONTINUED | OUTPATIENT
Start: 2025-07-11 | End: 2025-07-11

## 2025-07-11 RX ORDER — IBUPROFEN 400 MG/1
400 TABLET, FILM COATED ORAL EVERY 6 HOURS PRN
Status: DISCONTINUED | OUTPATIENT
Start: 2025-07-11 | End: 2025-07-11 | Stop reason: HOSPADM

## 2025-07-11 RX ORDER — SODIUM CHLORIDE, SODIUM LACTATE, POTASSIUM CHLORIDE, CALCIUM CHLORIDE 600; 310; 30; 20 MG/100ML; MG/100ML; MG/100ML; MG/100ML
75 INJECTION, SOLUTION INTRAVENOUS CONTINUOUS
Status: DISCONTINUED | OUTPATIENT
Start: 2025-07-11 | End: 2025-07-11 | Stop reason: HOSPADM

## 2025-07-11 RX ORDER — OXYMETAZOLINE HYDROCHLORIDE 0.05 G/100ML
SPRAY NASAL AS NEEDED
Status: DISCONTINUED | OUTPATIENT
Start: 2025-07-11 | End: 2025-07-11 | Stop reason: HOSPADM

## 2025-07-11 RX ADMIN — DEXMEDETOMIDINE 12 MCG: 100 INJECTION, SOLUTION INTRAVENOUS at 07:44

## 2025-07-11 RX ADMIN — CEFAZOLIN SODIUM 2000 MG: 2 SOLUTION INTRAVENOUS at 07:50

## 2025-07-11 RX ADMIN — PROPOFOL 250 MCG/KG/MIN: 10 INJECTION, EMULSION INTRAVENOUS at 07:44

## 2025-07-11 RX ADMIN — GLYCOPYRROLATE 0.3 MG: 0.2 INJECTION INTRAMUSCULAR; INTRAVENOUS at 10:01

## 2025-07-11 RX ADMIN — DEXMEDETOMIDINE 8 MCG: 100 INJECTION, SOLUTION INTRAVENOUS at 09:08

## 2025-07-11 RX ADMIN — DEXMEDETOMIDINE 8 MCG: 100 INJECTION, SOLUTION INTRAVENOUS at 08:13

## 2025-07-11 RX ADMIN — ROCURONIUM 50 MG: 50 INJECTION, SOLUTION INTRAVENOUS at 07:44

## 2025-07-11 RX ADMIN — ONDANSETRON 4 MG: 2 INJECTION INTRAMUSCULAR; INTRAVENOUS at 09:38

## 2025-07-11 RX ADMIN — MIDAZOLAM 2 MG: 1 INJECTION INTRAMUSCULAR; INTRAVENOUS at 07:33

## 2025-07-11 RX ADMIN — PROPOFOL 70 MCG/KG/MIN: 10 INJECTION, EMULSION INTRAVENOUS at 08:05

## 2025-07-11 RX ADMIN — GLYCOPYRROLATE 0.1 MG: 0.2 INJECTION INTRAMUSCULAR; INTRAVENOUS at 09:41

## 2025-07-11 RX ADMIN — FENTANYL CITRATE 50 MCG: 50 INJECTION INTRAMUSCULAR; INTRAVENOUS at 07:44

## 2025-07-11 RX ADMIN — FENTANYL CITRATE 50 MCG: 50 INJECTION INTRAMUSCULAR; INTRAVENOUS at 08:10

## 2025-07-11 RX ADMIN — GLYCOPYRROLATE 0.2 MG: 0.2 INJECTION INTRAMUSCULAR; INTRAVENOUS at 10:05

## 2025-07-11 RX ADMIN — DEXAMETHASONE SODIUM PHOSPHATE 10 MG: 10 INJECTION INTRAMUSCULAR; INTRAVENOUS at 07:48

## 2025-07-11 RX ADMIN — HYDROMORPHONE HYDROCHLORIDE 0.5 MG: 1 INJECTION, SOLUTION INTRAMUSCULAR; INTRAVENOUS; SUBCUTANEOUS at 09:08

## 2025-07-11 RX ADMIN — LIDOCAINE HYDROCHLORIDE 50 MG: 10 INJECTION, SOLUTION EPIDURAL; INFILTRATION; INTRACAUDAL at 07:44

## 2025-07-11 RX ADMIN — SODIUM CHLORIDE, SODIUM LACTATE, POTASSIUM CHLORIDE, AND CALCIUM CHLORIDE: .6; .31; .03; .02 INJECTION, SOLUTION INTRAVENOUS at 07:30

## 2025-07-11 RX ADMIN — NEOSTIGMINE METHYLSULFATE 2 MG: 1 INJECTION INTRAVENOUS at 10:01

## 2025-07-11 NOTE — ANESTHESIA POSTPROCEDURE EVALUATION
Post-Op Assessment Note    CV Status:  Stable  Pain Score: 1    Pain management: adequate       Mental Status:  Awake   Hydration Status:  Stable   PONV Controlled:  None   Airway Patency:  Patent     Post Op Vitals Reviewed: Yes    No anethesia notable event occurred.    Staff: Anesthesiologist           Last Filed PACU Vitals:  Vitals Value Taken Time   Temp 98 °F (36.7 °C) 07/11/25 11:00   Pulse 58 07/11/25 11:02   /64 07/11/25 11:00   Resp 12 07/11/25 11:02   SpO2 97 % 07/11/25 11:02   Vitals shown include unfiled device data.    Modified Sola:     Vitals Value Taken Time   Activity 2 07/11/25 11:00   Respiration 2 07/11/25 11:00   Circulation 2 07/11/25 11:00   Consciousness 2 07/11/25 11:00   Oxygen Saturation 2 07/11/25 11:00     Modified Sola Score: 10

## 2025-07-11 NOTE — DISCHARGE INSTR - AVS FIRST PAGE
Ángel Short M.D.  Facial Plastic & Reconstructive Surgery   Rhinoplasty Post-Operative Care  Office [phone]  Cell [phone]               At Home (in the days immediately following the procedure):     Try to sleep with your head elevated on 2-3 pillows   You may experience moderate bleeding from the nose--this is normal. The gauze dressing under your nose may be changed as necessary.   Iced packs placed over the eyes will help reduce swelling.  They should be used 20 minutes on/ 20 minutes off while awake for the first full day.  Crushed ice in ziplock bags or frozen peas or corn work well.    The outside and half inch inside each nostril may be cleaned with a Q-tip soaked in hydrogen peroxide.     Apply antibiotic ointment (Bacitracin) or Vaseline to the first half inch inside your nose and the external incision 3-4 times per day.   Take your medicines as prescribed.  REMEMBER:  DO NOT DRIVE WHILE TAKING PAIN MEDICATIONS.   You may shower with luke-warm water only (avoid getting the cast wet).   Do not blow your nose for 7-10 days.  If you need to sneeze, do so with an open mouth.   You may use ibuprofen (Motrin, Advil) and acetaminophen (Tyelnol) for pain control in addition to any prescribed pain medications.     You may get out of bed and go to the bathroom with assistance.  Bleeding should decrease over the first 24 hours; you may have bleeding when changing positions quickly.  You should keep the rigid dressing covering the bridge of your nose as dry as possible.  Your eyes may even swell shut.  Eat light, soft meals as tolerated, avoiding gas-stimulating foods.     Follow-up care:   Eat before coming to the office for post-operative visits.  Rest for the first week after the procedure, avoiding excessive physical activities, hard chewing, lifting objects over 8 lbs (about the weight of a phone book), or bending over. .  Be very gentle when brushing your upper teeth as they may be numb after  surgery. We request that you do not travel by plane for one week after surgery.  Lubricate your nose as directed with Q-tips and Vaseline to soften hardened crusts.  You can expect some light blood-tinged drainage from your nose for several days.  If your nose begins to bleed copiously, spray or instill Afrin (generic = Oxymetazoline HCL) nose drops into the nostril that is bleeding and wait.  If the bleeding continues for 5 minutes or clots expel and/or you begin to swallow blood, please call our office or on call surgeon at the numbers below.  If there is excessive pain, high fever (greater than 101.5oF), or if you injure your nose, please call our office or on call surgeon at the numbers below.     Follow-up visits:    At one week, the cast/splints will be removed; you may drive yourself to this appointment (as long as you are no longer taking prescription/narcotic pain medicines).  After cast removal, make-up may be worn, avoiding the incision lines. Additional follow-up visits will be scheduled at this time.  Note that final results may not be apparent until ONE YEAR after surgery.    Healing Care:   After the cast is removed, avoid striking or bumping your nose; try not to roll onto it while asleep.  Clean the external nasal skin gently but thoroughly with soap. The use of alcohol and tobacco products prolong swelling and healing and are best avoided for 2 weeks after surgery.   Do not expose your nose to sun for 4-6 weeks after surgery.  Use sunscreen (SPF 30 or higher) for 6 months after surgery if sun exposure is absolutely necessary.  Avoid any physical exercise that can cause over-heating or over-exertion for two weeks after the surgery.  Your nose may be swollen and stuffy for several months.  Complete healing may take 12 months.  It is to your advantage to return for all postoperative visits so that long-term results may be evaluated.      Frequently Asked Questions:  When can I shower and shampoo my  hair?   You may shower the day after your surgery, BUT KEEP THE NASAL CAST DRY.  This may mean you wash your face/hair in the sink instead.  It is important that you do not use hot water, as this can increase the swelling.  Lukewarm water is best.      When will the swelling and bruising go away?   This usually takes 7-10 days or so, but may take less or more time, depending on the individual.    When can I take aspirin?   You should not take aspirin for 2 weeks prior to or after surgery. The same is true for vitamin E, ginko, garlic pills, and other “natural” supplements.    When can I take ibuprofen?      Non-steroidal anti-inflammatory drugs such as advil (ibuprofen), alleve (naproxen), or other similar may be used immediately after surgery as per the guidelines on the package.      When can I wear my glasses?   You will be able to wear contact lenses as soon as you feel comfortable.  You may wear glasses one to two weeks after surgery, depending on the type of rhinoplasty you had.  In any case, you must be careful not to place any pressure on the glasses (and thus your nose).     When can I wear makeup?   Make-up can be applied after cast removal, but not directly on the incisions until 3 weeks after the procedure.    When will I see my results?   Final results in rhinoplasty can take 12 months.  However, notable changes should be evident in the weeks immediately after the procedure.  Typically, the 1-2 mm of residual swelling is what takes a variable amount of time to resolve, and can make subtle but significant differences in nasal shape.      What about exercise?   Please adhere to the following schedule:   Up to week 1 after surgery:  REST!  No strenuous exercise.  Walking is ok.  Week 1-3 after surgery:  You may begin light aerobic exercise, but no bending over/straining/lifting weights.  Week 3+:  You may begin more strenuous exercise, such as yoga, stretching, bending over, lifting weights.  Please  remember to start slowly.  Week 6+:  You may resume contact sports, such as soccer, basketball, etc    How to contact us:    Phone:  If you have questions or concerns, please call us at [phone] during business hours (8 am to 5 pm).  On nights and weekends, you may page the ENT surgeon on call  at [facility].  In case of emergency, please call [phone].

## 2025-07-11 NOTE — H&P
"H&P Exam - ENT   Ainsley Salinas 24 y.o. female MRN: [id number]  Unit/Bed#: OR [place] Encounter: [id number]    Assessment & Plan     Assessment:  Ainsley Salinas is a 24 y.o. female with nasal congestion and cosmetic concerns who presents for scheduled surgery    Plan:  To OR for RNVS, septoplasty, turbinate reduction and rhinoplasty    History of Present Illness   HPI:  Ainsley Salinas is a 24 y.o. female who presents for scheduled surgery.    Historical Information   Past Medical History[1]  Past Surgical History[2]  Social History   Social History     Substance and Sexual Activity   Alcohol Use Yes    Comment: socially-     Social History     Substance and Sexual Activity   Drug Use Not Currently     Tobacco Use History[3]  E-Cigarette/Vaping    E-Cigarette Use Never User      E-Cigarette/Vaping Substances    Nicotine No     THC No     CBD No     Flavoring No     Other No     Unknown No      Family History: Family History[4]    Meds/Allergies   all medications and allergies reviewed  Allergies[5]    Objective   Vitals: Blood pressure 107/62, pulse 71, temperature 98.5 °F (36.9 °C), temperature source Temporal, resp. rate 18, height 5' 4\" (1.626 m), weight 70.3 kg (155 lb), last menstrual period 07/09/2025, SpO2 99%.    No intake or output data in the 24 hours ending 07/11/25 0724    Invasive Devices       Peripheral Intravenous Line  Duration             Peripheral IV 07/11/25 Dorsal (posterior);Right Hand <1 day                    CV: RRR  Lungs: clear b/l   Abd: nttp  MSK: sink intact, THOMAS         [1]   Past Medical History:  Diagnosis Date    Heart murmur    [2]   Past Surgical History:  Procedure Laterality Date    NO PAST SURGERIES     [3]   Social History  Tobacco Use   Smoking Status Never   Smokeless Tobacco Never   [4] No family history on file.  [5] No Known Allergies    "

## 2025-07-11 NOTE — OP NOTE
OPERATIVE REPORT  PATIENT NAME: Ainsley Salinas    :  2000  MRN: [id number]  Pt Location: [facility] OR ROOM 05    SURGERY DATE: 2025    Surgeons and Role:     * Ángel Short MD - Primary     * Edwina England MD - Assisting     * Rachel Severino MD - Assisting    PREOPERATIVE DIAGNOSES:  1. Nasal valve stenosis with obstruction  2. Septal deviation with nasal obstruction  3. Turbinate hypertrophy  4. Nasal deformity    POSTOPERATIVE DIAGNOSES:  Same    PROCEDURES:  1. Open repair of nasal stenosis  2. Septoplasty  3. Septal cartilage graft to nose  4. Bilateral submucous resection of inferior turbinates  5. Rhinoplasty    SURGEON:  Ángel Short MD    ASSISTANTS: MD Rachel Mckeon MD    ANESTHESIA:  General     ESTIMATED BLOOD LOSS:  125 cc    FLUIDS:  Crystalloid    COMPLICATIONS:  None.    FINDINGS:  Total cosmetic time for the procedure was 60 minutes.    INDICATIONS FOR PROCEDURE:  This is a 24 y.o. year old female whom I've seen previously in consultation regarding nasal obstruction and desire to change shape of the nose.  Risks and benefits of the above procedures were discussed at length, including but not limited to bleeding, infection, external or internal nasal scars, septal perforation, change in the shape of the nose, undesired aesthetic outcome, nasal obstruction, among others.  Patient understands and consents to proceed.    BODY OF REPORT:   The patient was brought to the procedure room, placed on the procedure table in supine position. General  anesthesia was induced without complication.  The table was rotated. The nose was injected with a mixture of 1% lidocaine with 1:100,000 of epinephrine in the tip, columella, dorsum, alar bases bilaterally, and septum bilaterally. An afrin-soaked pledget was placed in each nostril.  The face was prepped and draped in the usual sterile fashion. The eyelids were gently taped sterilely after removing any prep from the lid skin.  A  standard timeout was taken to confirm patient name, procedures, sidedness, allergies, medications administered among others.     Submucous resection of the inferior turbinates was performed bilaterally as follows:  The anterior portion of the inferior turbinate was incised using electrocautery.  A submucoperiosteal dissection was performed using a Mitul elevator.  The anterior 1.5-2 cm of bone was removed with a Ann forcep.  Mucosa was preserved.  The turbinate was outfractured using a Tarkio elevator.    Septoplasty was performed as follows: A left-sided hemitransfixion incision was made with a #15 blade.  A submucoperichondrial plane was elevated posteriorly to the bony septum, inferiorly to the floor of the nose, and superiorly to the dorsum.  Dissection was performed around the anterocaudal septum in the same plane, and the opposite side was similarly exposed.    Repair of nasal vestibular stenosis was performed as follows: An inverted gull-wing incision was made at the mid-columellar level using a 6700 blade, in continuity with incisions 1-2 mm below the inferior margin of the lower lateral cartilages intranasally.  The nose was decorticated in a sub-SNAS plane using scissors, to the level of the rhinion.  At this level, a McKenty elevator was used to dissect subperiosteally to the nasofrontal suture.    Rhinoplasty was performed as follows: The upper lateral cartilages were released from the septum.  The bony dorsum was addressed using a pull rasp (#4).  Further fine rasping was performed using a #3 pull rasp.  Irrigation with saline was performed to remove any bony dust.  The septum was then addressed.  Careful comparison to the preoperative planning photographs was undertaken at each of these steps.  Bilateral medial vertical ostetotomies were performed under direct vision using the straight osteotome to remove the triangular bone of the upper dorsum, allowing better roof closure.  Bilateral lateral  osteotomies were performed in high-low-high fashion as follows:  Electrocautery was use to incise the mucosa over the piriform aperture on each side, just above the anterior inferior turbinate.  A Pipestone elevator was used to dissect subperiosteally approximately 3-5 mm.  The curved guarded osteotomes were used to create the high-low-high osteotomy, which was then slightly infractured, closing the open roof.  No flail segments were created.      A #15 blade was used to incise along the horizontal (A-P axis) of the LLC, and the vestibular mucosa was carefully dissected off of the caudal portion.  The preserved caudal portion was 7 mm, and 5 mm was resected along the cephalic border.        Repair of nasal vestibular stenosis was continued as follows: The upper lateral cartilages were then repaired to the septum using upper-lateral auto- grafts using 5-0 PDS.        She  was checked for contour and tip support.  She  was found to have good contour and symmetry from all angles, including lateral, frontal and base view.  Tip support was good.  The hemitransfixion incision was closed using multiple interrupted 5-0 chromic sutures.  The septal space was closed using a mattressed 4-0 plane gut suture on a Shyam needle.  The skin flap was replaced and contour verified.  The columella was closed using multiple interrupted 6-0 Prolene sutures.  Intranasal incisions were carefully sutured using  5-0 chromic gut suture.  Bilateral Thayer splints were placed, each coated in bacitracin.  A 4-0 nylon was used to secure this through the septum anteriorly.  An external thermoplastic splint was placed after taping the nose.    The patient was returned to the care of Anesthesia, extubated without difficulty, and taken to the recovery area in stable condition. All instruments and sponge counts were correct at the end of the procedure. Ángel DAILY MD, was present for and performed all key elements of the procedure.       ABIMBOLA  was present for the entire procedure.    Patient Disposition:  PACU  and extubated and stable         SIGNATURE: Ángel Short MD  DATE: July 11, 2025  TIME: 10:11 AM

## 2025-07-11 NOTE — ANESTHESIA POSTPROCEDURE EVALUATION
Post-Op Assessment Note    CV Status:  Stable  Pain Score: 0    Pain management: adequate       Mental Status:  Awake and somnolent   Hydration Status:  Stable   PONV Controlled:  None   Airway Patency:  Patent     Post Op Vitals Reviewed: Yes    No anethesia notable event occurred.    Staff: CRNA   Comments: Patient in PACU, airway patent, VSS. No c/o pain or nausea.          Last Filed PACU Vitals:  Vitals Value Taken Time   Temp 97.4    Pulse 56 07/11/25 10:35   /60 07/11/25 10:33   Resp 10 07/11/25 10:35   SpO2 96 % 07/11/25 10:35   Vitals shown include unfiled device data.

## (undated) DEVICE — Device

## (undated) DEVICE — SPONGE GAUZE 4 X 4 16 PLY STRL PLASTIC TRAY LF

## (undated) DEVICE — APPLICATOR BENZOINE TINCTURE 0.6ML

## (undated) DEVICE — SPECIMEN CONTAINER STERILE PEEL PACK

## (undated) DEVICE — PACK PBDS NASAL RF

## (undated) DEVICE — SUT CHROMIC 5-0 P-3 18 IN 687G

## (undated) DEVICE — POV-IOD SOLUTION 4OZ BT

## (undated) DEVICE — BLADE BEAVER MINI SZ 67